# Patient Record
(demographics unavailable — no encounter records)

---

## 2024-12-27 NOTE — PROCEDURE
[de-identified] : -   Pre-operative Diagnosis: Throat eval Post-operative Diagnosis: Dried secretions otherwise normal exam  Anesthesia: Topical - 1 % Lidocaine/Phenylephrine Procedure: Flexible Laryngoscopy   Procedure Details: The patient was placed in the sitting position. After decongestant and anesthesia were applied the laryngoscope was passed. The nasal cavities, nasopharynx, oropharynx, hypopharynx, and larynx were all examined. Vocal folds were examined during respiration and phonation. The following findings were noted:   Findings: Nose: Septum is midline, turbinates are normal, nasal airways patent, mucosa normal Nasopharynx: Adenoids normal, no masses, eustachian tube normal Oropharynx: Pharyngeal walls symmetric and without lesion. Tonsils/fossae symmetric and normal. Hypopharynx: Hypopharynx and pyriform sinuses without lesion. No masses or asymmetry. No pooling of secretions. Larynx: Epiglottis and aryepiglottic folds were sharp and crisp bilaterally. Bilateral false and true vocal folds normal appearance. Bilateral vocal folds fully mobile and symmetric. Airway was widely patent.   Condition: Stable. Patient tolerated procedure well.   Complications: None

## 2024-12-27 NOTE — ASSESSMENT
[FreeTextEntry1] : - 12/27/24: 28 y/o M presents to evaluate airway/ throat. He was in ED in [place] approximately 1.5 months ago and diagnosed with epiglottitis. Today he is asymptomatic. Physical exam was normal and he was reassured. I recommended hydration. For any new or similar symptoms I recommended he go back to ED, Benadryl, consider allergy consultation. Follow up with me as needed.  -Hydration -Strict ED precautions for similar symptoms  -Follow up with me PRN

## 2024-12-27 NOTE — ASSESSMENT
[FreeTextEntry1] : - 12/27/24: 30 y/o M presents to evaluate airway/ throat. He was in ED in [place] approximately 1.5 months ago and diagnosed with epiglottitis. Today he is asymptomatic. Physical exam was normal and he was reassured. I recommended hydration. For any new or similar symptoms I recommended he go back to ED, Benadryl, consider allergy consultation. Follow up with me as needed.  -Hydration -Strict ED precautions for similar symptoms  -Follow up with me PRN

## 2024-12-27 NOTE — PROCEDURE
[de-identified] : -   Pre-operative Diagnosis: Throat eval Post-operative Diagnosis: Dried secretions otherwise normal exam  Anesthesia: Topical - 1 % Lidocaine/Phenylephrine Procedure: Flexible Laryngoscopy   Procedure Details: The patient was placed in the sitting position. After decongestant and anesthesia were applied the laryngoscope was passed. The nasal cavities, nasopharynx, oropharynx, hypopharynx, and larynx were all examined. Vocal folds were examined during respiration and phonation. The following findings were noted:   Findings: Nose: Septum is midline, turbinates are normal, nasal airways patent, mucosa normal Nasopharynx: Adenoids normal, no masses, eustachian tube normal Oropharynx: Pharyngeal walls symmetric and without lesion. Tonsils/fossae symmetric and normal. Hypopharynx: Hypopharynx and pyriform sinuses without lesion. No masses or asymmetry. No pooling of secretions. Larynx: Epiglottis and aryepiglottic folds were sharp and crisp bilaterally. Bilateral false and true vocal folds normal appearance. Bilateral vocal folds fully mobile and symmetric. Airway was widely patent.   Condition: Stable. Patient tolerated procedure well.   Complications: None